# Patient Record
Sex: FEMALE | URBAN - METROPOLITAN AREA
[De-identification: names, ages, dates, MRNs, and addresses within clinical notes are randomized per-mention and may not be internally consistent; named-entity substitution may affect disease eponyms.]

---
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
## 2024-10-15 ENCOUNTER — APPOINTMENT (RX ONLY)
Dept: URBAN - METROPOLITAN AREA CLINIC 24 | Facility: CLINIC | Age: 24
Setting detail: DERMATOLOGY
End: 2024-10-15

## 2024-10-15 DIAGNOSIS — Z41.9 ENCOUNTER FOR PROCEDURE FOR PURPOSES OTHER THAN REMEDYING HEALTH STATE, UNSPECIFIED: ICD-10-CM

## 2024-10-15 PROCEDURE — ? COSMETIC CONSULTATION: MICRONEEDLING

## 2024-10-15 PROCEDURE — ? COSMETIC CONSULTATION: CHEMICAL PEELS

## 2024-10-15 PROCEDURE — ? OTHER (COSMETIC)

## 2024-10-15 ASSESSMENT — LOCATION SIMPLE DESCRIPTION DERM
LOCATION SIMPLE: RIGHT CHEEK
LOCATION SIMPLE: LEFT CHEEK

## 2024-10-15 ASSESSMENT — LOCATION DETAILED DESCRIPTION DERM
LOCATION DETAILED: RIGHT SUPERIOR MEDIAL BUCCAL CHEEK
LOCATION DETAILED: RIGHT CENTRAL MALAR CHEEK
LOCATION DETAILED: LEFT MEDIAL MALAR CHEEK

## 2024-10-15 ASSESSMENT — LOCATION ZONE DERM: LOCATION ZONE: FACE

## 2024-10-15 NOTE — PROCEDURE: OTHER (COSMETIC)
Other (Free Text): Pt came in for a consultation regarding acne and scarring. Pt is interested in micro-needling and chemical peels. Pt was recommended vi peel purify with precision plus, pt is aware doing microneedling with active acne can worsen condition. Pt was recommended papaya cleanser, b5 gel, Alpharet intensive, peel pads, and spf pyshical fusion .
Detail Level: Zone